# Patient Record
Sex: FEMALE | Race: WHITE | NOT HISPANIC OR LATINO
[De-identification: names, ages, dates, MRNs, and addresses within clinical notes are randomized per-mention and may not be internally consistent; named-entity substitution may affect disease eponyms.]

---

## 2020-04-05 ENCOUNTER — RESULT REVIEW (OUTPATIENT)
Age: 60
End: 2020-04-05

## 2020-06-30 PROBLEM — Z00.00 ENCOUNTER FOR PREVENTIVE HEALTH EXAMINATION: Status: ACTIVE | Noted: 2020-06-30

## 2020-07-08 ENCOUNTER — APPOINTMENT (OUTPATIENT)
Dept: RADIATION ONCOLOGY | Facility: CLINIC | Age: 60
End: 2020-07-08
Payer: COMMERCIAL

## 2020-07-08 VITALS
OXYGEN SATURATION: 99 % | BODY MASS INDEX: 30.45 KG/M2 | HEART RATE: 76 BPM | RESPIRATION RATE: 12 BRPM | DIASTOLIC BLOOD PRESSURE: 74 MMHG | HEIGHT: 67 IN | SYSTOLIC BLOOD PRESSURE: 132 MMHG | TEMPERATURE: 98 F | WEIGHT: 194 LBS

## 2020-07-08 DIAGNOSIS — K70.30 ALCOHOLIC CIRRHOSIS OF LIVER W/OUT ASCITES: ICD-10-CM

## 2020-07-08 DIAGNOSIS — Z86.39 PERSONAL HISTORY OF OTHER ENDOCRINE, NUTRITIONAL AND METABOLIC DISEASE: ICD-10-CM

## 2020-07-08 DIAGNOSIS — Z87.898 PERSONAL HISTORY OF OTHER SPECIFIED CONDITIONS: ICD-10-CM

## 2020-07-08 DIAGNOSIS — E04.2 NONTOXIC MULTINODULAR GOITER: ICD-10-CM

## 2020-07-08 PROCEDURE — 99214 OFFICE O/P EST MOD 30 MIN: CPT

## 2020-07-08 RX ORDER — OMEGA-3/DHA/EPA/FISH OIL 300-1000MG
CAPSULE ORAL
Refills: 0 | Status: ACTIVE | COMMUNITY

## 2020-07-08 RX ORDER — SPIRONOLACTONE 100 MG/1
100 TABLET ORAL
Refills: 0 | Status: ACTIVE | COMMUNITY

## 2020-07-08 RX ORDER — CALCIUM CITRATE 200 MG (950 MG) TABLET 200(950)MG
TABLET ORAL
Refills: 0 | Status: ACTIVE | COMMUNITY

## 2020-07-08 RX ORDER — LETROZOLE TABLETS 2.5 MG/1
2.5 TABLET, FILM COATED ORAL
Refills: 0 | Status: ACTIVE | COMMUNITY

## 2020-07-08 RX ORDER — ASPIRIN 81 MG/1
81 TABLET ORAL
Refills: 0 | Status: ACTIVE | COMMUNITY

## 2020-07-08 NOTE — HISTORY OF PRESENT ILLNESS
[FreeTextEntry1] : 59 year old female\par \par Stage IA, T1b N0 (i+) M0, moderately differentiated invasive lobular carcinoma of the left breast, estrogen receptor/progesterone receptor positive and HER 2 negative. \par \par Dr. Charles consulted patient on 10/29/18:\par  The patient's history dates to approximately five years ago when she underwent\par breast biopsies which revealed atypical ductal hyperplasia and atypical lobular\par hyperplasia. She was subsequently recommended to undergo screening MRI scans as\par she was felt to be at high risk for subsequent development of breast cancer. On\par 18, screening MRI scan of the breast demonstrated an 8 mm area of clumped\par enhancement and/or nodule in the left breast, lower inner quadrant.  Ultrasound\par guided core biopsy performed on 18 demonstrated invasive lobular\par carcinoma, classic type. Estrogen receptor and progesterone receptor assays\par were both positive. HER2 was negative.  On 10/09/18 JOSE ANDERSON MD performed a left\par breast lumpectomy and left axillary sentinel lymph node biopsy following MARIAN \par localization. Pathology confirmed a 9 mm invasive lobular carcinoma located at\par the 8 o'clock position of the left breast. The tumor was moderately\par differentiated. Estrogen receptor and progesterone receptor assays were both\par positive. HER2 was negative. One of three left axillary nodes was noted to contain\par two isolated tumor cells, seen only on cytokeratin stain. An Oncotype DX score\par was obtained with the result of 13. The patient has been seen by NUZHAT CASPER\par MD, [facility] on 10/26/18 for medical oncology consultation, who has recommended an \par aromatase inhibitor, likely Arimidex after completion of radiotherapy. \par \par GYNECOLOGIC HISTORY: The patient is  3, para 3. She experienced menarche at age 11, menopause at age 42. Her age of first pregnancy was 24. She denies any exogenous hormone exposure. She states she did breast-feed all of her\par children. The patient did have at least two or three previous benign breast biopsies in both breasts over the past five years.\par \par Radiation Therapy: Post lumpectomy radiotherapy to the left breast, total dose 50 Gy in 25 fractions completed on 12/10/18.\par \par 19- MRI bilateral - \par Dense breasts, mild background enhancement. \par Left breast: Lumpectomy scar lower lower  inner new since previous. Small postoperative seroma anteriorly lower inner. No dominant mass or suspicious enhancing lesions. No axillary lymphadenopathy. Mild skin thickening consistent with post radiation changes. \par \par Right breast: Foci susceptibly artifact superiorly unchanged consistent with prior biopsies. No dominant mass or suspicious enhancing lesion. No axillary lymphadenopathy. \par BI-RADS 2 benign findings\par \par 2020- Flu negative\par 2020- COVID positive \par \par 20- Mammogram- Bilateral- No suspicious interval change. Pt did not have an US.\par BI-RADS 1: Negative\par \par 6/15/20- F/U with Dr. Anderson- Breast MRI scheduled for 2020\par \par Pt here for f/u is feeling well. Pt states she is feeling well now post her diagnosis of COVID in April, symptom free and back to work. She lost her mother and aunt to COVID pneumonia. Denies breast pain, denies any skin issues. Taking Letrozole 2.5 mg once a day prescribed by ODILON Casper, continues to have hot flashes but they are manageable.

## 2020-07-08 NOTE — REVIEW OF SYSTEMS
[Breast Pain: Grade 0] : Breast Pain: Grade 0 [Pruritus: Grade 0] : Pruritus: Grade 0 [Alopecia: Grade 0] : Alopecia: Grade 0 [Skin Hyperpigmentation: Grade 0] : Skin Hyperpigmentation: Grade 0 [Skin Atrophy: Grade 0] : Skin Atrophy: Grade 0 [Skin Induration: Grade 0] : Skin Induration: Grade 0 [Dermatitis Radiation: Grade 0] : Dermatitis Radiation: Grade 0

## 2020-07-08 NOTE — DISEASE MANAGEMENT
[Pathological] : TNM Stage: p [TTNM] : 1b [NTNM] : 0 [FreeTextEntry4] : Invasive lobular carcinoma of the left breast [I] : I [MTNM] : 0

## 2020-07-08 NOTE — VITALS
[Least Pain Intensity: 0/10] : 0/10 [Maximal Pain Intensity: 0/10] : 0/10 [90: Able to carry normal activity; minor signs or symptoms of disease.] : 90: Able to carry normal activity; minor signs or symptoms of disease.  [90: Minor restrictions in physically strenous activity.] : 90: Minor restrictions in physically strenuous activity. [ECOG Performance Status: 0 - Fully active, able to carry on all pre-disease performance without restriction] : Performance Status: 0 - Fully active, able to carry on all pre-disease performance without restriction

## 2020-07-09 ENCOUNTER — TRANSCRIPTION ENCOUNTER (OUTPATIENT)
Age: 60
End: 2020-07-09

## 2021-01-05 NOTE — HISTORY OF PRESENT ILLNESS
[FreeTextEntry1] : Ms. Eldridge is a 59 year old female with stage IA, T1b N0 (i+) M0, moderately differentiated invasive lobular carcinoma of the left breast, estrogen receptor/progesterone receptor positive and HER 2 negative. Post lumpectomy radiotherapy was done to the left breast, a total dose of 50 Gy in 25 fractions was completed on 12/10/18. She was last seen in our office on 7/8/2020, at that time she was recovering from the devastating loss of her mother. 14 of her family members tested had positive for COVID. She is taking Letrozole 2.5 mg once a day prescribed by Dr. Colvin.

## 2021-01-05 NOTE — DISEASE MANAGEMENT
[Pathological] : TNM Stage: p [FreeTextEntry4] : Invasive lobular carcinoma of the left breast [TTNM] : 1b [NTNM] : 0 [MTNM] : 0 [I] : I [de-identified] : Post lumpectomy radiotherapy was done to the left breast, a total dose of 50 Gy in 25 fractions was completed on 12/10/18

## 2021-01-06 ENCOUNTER — APPOINTMENT (OUTPATIENT)
Dept: RADIATION ONCOLOGY | Facility: CLINIC | Age: 61
End: 2021-01-06

## 2021-04-06 ENCOUNTER — APPOINTMENT (OUTPATIENT)
Dept: DISASTER EMERGENCY | Facility: CLINIC | Age: 61
End: 2021-04-06

## 2023-06-13 ENCOUNTER — APPOINTMENT (OUTPATIENT)
Dept: HEMATOLOGY ONCOLOGY | Facility: CLINIC | Age: 63
End: 2023-06-13

## 2023-06-13 NOTE — DISCUSSION/SUMMARY
[FreeTextEntry1] : REASON FOR CONSULT \par Kirti Eldridge is a 62-year-old female who was referred by Dr. Debby Colvin for cancer genetic counseling and risk assessment due to her personal history of breast cancer and family history of breast and prostate cancer.  \par  \par RELEVANT MEDICAL HISTORY \par Ms. Eldridge was diagnosed with a left breast invasive lobular carcinoma in 2018 at age 57.  Pathology showed a moderately differentiated tumor which was ER+/ID+/HER2-. She was treated with left breast lumpectomy and radiation, and has been taking aromatase inhibitors for the past 4 years. \par  \par FAMILY HISTORY: \par Ms. Eldridge reports that both her brother and maternal uncle have prostate cancer, and her maternal aunt  had breast cancer in her early 50s, see pedigree.  \par  \par OTHER MEDICAL AND SURGICAL HISTORY: \par Hashimoto thyroiditis, multinodular thyroid goiter. Osteopenia. Liver cirrhosis, focal nodular hyperplasia of liver. Transjugular intrahepatic portosystemic shunt. C-sections x3, cholecystectomy. Mohs procedure. \par  \par PAST OB/GYN HISTORY: \par Obstetrical History:  \par Age at Menarche: 11\par Menopausal with LMP at age 42\par Age at First Live Birth: 24\par Oral Contraceptive Use: No\par Hormone Replacement Therapy: No\par  \par CANCER SCREENING HISTORY:   \par Breast: previous history of breast biopsies that showed atypical ductal hyperplasia and atypical lobular hyperplasia within the last 5-10 years. Dense breasts. Mammogram reported normal in 2022 and repeats annually. Last MRI was 2023, normal and annual follow recommended however needs to have MRI ordered by a Breast Surgeon but not under the care of one yet. \par GYN: Last seen in 2022, reported normal.\par Colon: Reported negative colonoscopies, follows up every 10 years. Can’t recall the date of last scope. \par Skin: Annual exams with Dermatology. History of squamous cell on chest, Mohs procedure in .   \par  \par SOCIAL HISTORY: \par •	\par •	Tobacco-product use: Yes, former, (smoked for 5 years, 1 pack/day and quit at age 20)\par  \par FAMILY HISTORY: \par Both her maternal and paternal ancestry was reported as [place]. A detailed family history of cancer was ascertained. Relevant diagnoses are detailed below and in the scanned pedigree.  \par  \par To Ms. Eldridge’s knowledge, no one in the family has had germline testing for cancer susceptibility.   \par  \par  \par RISK ASSESSMENT: \par Ms. Eldridge’s personal history of breast cancer along with her family history of breast and prostate cancer is suggestive of an inherited predisposition to hereditary cancer syndromes. Ms. Eldridge was interested in pursuing genetic testing to provide information for her children. We therefore recommended genetic testing with [facility]’s breast and gynecological and prostate cancer panels. This test analyzes 21 genes: ANDREA, BARD1, BRCA1, BRCA2, BRIP1, CDH1, CHEK2, EPCAM, HOXB13, MLH1, MSH2, MSH6, NBN, NF1, PALB2, PMS2, PTEN, RAD51C, RAD51D, STK11, TP53.\par  \par We discussed the risks, benefits and limitations, and implications of genetic testing. We also discussed the psychosocial implications of genetic testing. Possible test results were reviewed with Ms. Eldridge, along with associated medical management options. \par  \par Ms. Eldridge consented to the above-mentioned genetic testing panel. Blood was drawn in our laboratory and sent to [facility] today. \par  \par  \par PLAN: \par  \par 1.Blood drawn today will be sent to [facility] for analysis.  \par 2.We will contact Ms. Eldridge once the results are available and will schedule a follow-up appointment, as needed. Results generally return in 2-3 weeks from the day the sample is received in the lab. \par  \par  \par  \par For any additional questions please call [facility] at [phone].  \par  \par  \par Wanda Lawson, MSc, [facility] \par Genetic Counselor, [facility] \par  \par  \par CC:  \par Dr. Debby Colvin\par  \par \par

## 2023-07-05 ENCOUNTER — NON-APPOINTMENT (OUTPATIENT)
Age: 63
End: 2023-07-05

## 2023-09-04 NOTE — DISCUSSION/SUMMARY
[FreeTextEntry1] : RESULTS TRANSMISSION  Kirti Eldridge is a 62-year-old female who was called on 7/5/2023 for a discussion regarding her genetic testing results related to hereditary cancer predisposition.    Ms. Eldridge was originally seen at [facility] on 6/13/2023 for hereditary cancer predisposition risk assessment. She has a personal history of breast cancer as well as a family history of cancer. Ms. Eldridge decided to pursue genetic testing using breast and gynecological ad prostate cancer panels offered by [facility].    TEST RESULTS: NEGATIVE    No pathogenic (disease-causing) variants or VUSs were detected in the following genes:  ANDREA, BARD1, BRCA1, BRCA2, BRIP1, CDH1, CHEK2, EPCAM, HOXB13, MLH1, MSH2, MSH6, NBN, NF1, PALB2, PMS2, PTEN, RAD51C, RAD51D, STK11, TP53.    RESULTS INTERPRETATION AND ASSESSMENT:  Given Ms. Eldridge's personal and current reported family history of cancer, and her negative genetic test results, the following screening guidelines and risk-reducing recommendations were discussed:    BREAST:  Long-term management and surveillance should be based on Ms. Eldridge's on- or post-treatment protocol as recommended by her oncology team. Of note, Ms. Eldridge is status post left lumpectomy and radiation. She is currently accessing annual mammograms and would like to establish follow up care with a breast surgeon. We would be happy to refer Ms. Eldridge to [facility] Breast Specialist team should she wish.    OTHER:   - In the absence of other indications, Ms. Eldridge should practice age-appropriate cancer screening of other organ systems as recommended for the general population.      We also discussed that, while no cause of the patient's personal and family history of cancer was identified, this result, while reassuring, does not entirely rule out a hereditary cancer risk in the patient. It is possible, although unlikely, the patient has a mutation in one of the genes tested that is not detectable by this analysis, or has a mutation in a different gene, either known or unknown. It is also possible there is a hereditary cancer predisposition in the family, but the patient did not inherit it.    We informed Ms. Eldridge that our knowledge of genetics and inherited cancer conditions is changing rapidly. Therefore, we recommended that she contact our office, every 2 to 3 years, to discuss relevant advances in cancer genetics.  We emphasized the importance of re-contacting us with updates regarding her personal and family history of cancer as well as any updates regarding additional cancer genetic test results performed for the patient and/or family members.  Such updates could possibly change our risk assessment and recommendations.     In addition, we discussed Ms. Eldridge's brother and maternal aunt with cancer could consider pursuing cancer risk assessment genetic counseling with the option of genetic testing.     PLAN:  1.	See above for recommended screening and risk-reduction strategies.  2.	Patient informed consult note(s) will be available through their [email or web address] patient portal and genetic test results will be released via [facility]'s laboratory portal.   3.	Ms. Eldridge was encouraged to contact us every 2-3 years to discuss relevant advances in cancer genetics, or sooner if there are any changes in her personal or family history of cancer.      For any additional questions please call [facility] at [phone].       Wanda Lawson, MSc, [facility]  Genetic Counselor, [facility]      CC:   Ms. Kirti Eldridge Dr. Debby Colvin

## 2023-10-18 ENCOUNTER — NON-APPOINTMENT (OUTPATIENT)
Age: 63
End: 2023-10-18

## 2023-10-18 ENCOUNTER — APPOINTMENT (OUTPATIENT)
Dept: BREAST CENTER | Facility: CLINIC | Age: 63
End: 2023-10-18
Payer: COMMERCIAL

## 2023-10-18 VITALS
SYSTOLIC BLOOD PRESSURE: 143 MMHG | DIASTOLIC BLOOD PRESSURE: 78 MMHG | HEIGHT: 67 IN | BODY MASS INDEX: 29.82 KG/M2 | HEART RATE: 99 BPM | WEIGHT: 190 LBS

## 2023-10-18 DIAGNOSIS — R92.30 INCONCLUSIVE MAMMOGRAM: ICD-10-CM

## 2023-10-18 DIAGNOSIS — Z80.3 FAMILY HISTORY OF MALIGNANT NEOPLASM OF BREAST: ICD-10-CM

## 2023-10-18 DIAGNOSIS — Z87.39 PERSONAL HISTORY OF OTHER DISEASES OF THE MUSCULOSKELETAL SYSTEM AND CONNECTIVE TISSUE: ICD-10-CM

## 2023-10-18 DIAGNOSIS — R92.2 INCONCLUSIVE MAMMOGRAM: ICD-10-CM

## 2023-10-18 DIAGNOSIS — C50.312 MALIGNANT NEOPLASM OF LOWER-INNER QUADRANT OF LEFT FEMALE BREAST: ICD-10-CM

## 2023-10-18 DIAGNOSIS — Z17.0 MALIGNANT NEOPLASM OF LOWER-INNER QUADRANT OF LEFT FEMALE BREAST: ICD-10-CM

## 2023-10-18 DIAGNOSIS — F10.21 ALCOHOL DEPENDENCE, IN REMISSION: ICD-10-CM

## 2023-10-18 DIAGNOSIS — Z78.9 OTHER SPECIFIED HEALTH STATUS: ICD-10-CM

## 2023-10-18 DIAGNOSIS — Z87.891 PERSONAL HISTORY OF NICOTINE DEPENDENCE: ICD-10-CM

## 2023-10-18 PROCEDURE — 99204 OFFICE O/P NEW MOD 45 MIN: CPT

## 2023-10-18 RX ORDER — HYDROXYCHLOROQUINE SULFATE 200 MG/1
200 TABLET, FILM COATED ORAL
Refills: 0 | Status: ACTIVE | COMMUNITY

## 2023-10-18 RX ORDER — METOLAZONE 5 MG
TABLET ORAL
Refills: 0 | Status: ACTIVE | COMMUNITY

## 2023-10-18 RX ORDER — VALACYCLOVIR HYDROCHLORIDE 1 G/1
TABLET, FILM COATED ORAL
Refills: 0 | Status: ACTIVE | COMMUNITY

## 2023-10-18 RX ORDER — CHROMIUM 200 MCG
TABLET ORAL
Refills: 0 | Status: ACTIVE | COMMUNITY

## 2023-10-27 ENCOUNTER — NON-APPOINTMENT (OUTPATIENT)
Age: 63
End: 2023-10-27

## 2024-11-22 ENCOUNTER — NON-APPOINTMENT (OUTPATIENT)
Age: 64
End: 2024-11-22

## 2025-01-10 ENCOUNTER — APPOINTMENT (OUTPATIENT)
Dept: BREAST CENTER | Facility: CLINIC | Age: 65
End: 2025-01-10
Payer: MEDICARE

## 2025-01-10 VITALS
HEART RATE: 86 BPM | SYSTOLIC BLOOD PRESSURE: 133 MMHG | BODY MASS INDEX: 31.86 KG/M2 | DIASTOLIC BLOOD PRESSURE: 64 MMHG | HEIGHT: 67 IN | WEIGHT: 203 LBS

## 2025-01-10 DIAGNOSIS — K70.30 ALCOHOLIC CIRRHOSIS OF LIVER W/OUT ASCITES: ICD-10-CM

## 2025-01-10 DIAGNOSIS — Z87.39 PERSONAL HISTORY OF OTHER DISEASES OF THE MUSCULOSKELETAL SYSTEM AND CONNECTIVE TISSUE: ICD-10-CM

## 2025-01-10 DIAGNOSIS — Z80.3 FAMILY HISTORY OF MALIGNANT NEOPLASM OF BREAST: ICD-10-CM

## 2025-01-10 DIAGNOSIS — F10.21 ALCOHOL DEPENDENCE, IN REMISSION: ICD-10-CM

## 2025-01-10 DIAGNOSIS — R92.30 DENSE BREASTS, UNSPECIFIED: ICD-10-CM

## 2025-01-10 DIAGNOSIS — Z91.89 OTHER SPECIFIED PERSONAL RISK FACTORS, NOT ELSEWHERE CLASSIFIED: ICD-10-CM

## 2025-01-10 DIAGNOSIS — Z17.0 MALIGNANT NEOPLASM OF LOWER-INNER QUADRANT OF LEFT FEMALE BREAST: ICD-10-CM

## 2025-01-10 DIAGNOSIS — C50.312 MALIGNANT NEOPLASM OF LOWER-INNER QUADRANT OF LEFT FEMALE BREAST: ICD-10-CM

## 2025-01-10 DIAGNOSIS — R92.2 INCONCLUSIVE MAMMOGRAM: ICD-10-CM

## 2025-01-10 DIAGNOSIS — Z12.31 ENCOUNTER FOR SCREENING MAMMOGRAM FOR MALIGNANT NEOPLASM OF BREAST: ICD-10-CM

## 2025-01-10 PROCEDURE — 99204 OFFICE O/P NEW MOD 45 MIN: CPT

## 2025-04-03 ENCOUNTER — NON-APPOINTMENT (OUTPATIENT)
Age: 65
End: 2025-04-03

## 2025-04-04 ENCOUNTER — APPOINTMENT (OUTPATIENT)
Dept: PLASTIC SURGERY | Facility: CLINIC | Age: 65
End: 2025-04-04

## 2025-04-04 PROCEDURE — MS002: CPT
